# Patient Record
Sex: MALE | Race: WHITE | NOT HISPANIC OR LATINO | Employment: FULL TIME | URBAN - METROPOLITAN AREA
[De-identification: names, ages, dates, MRNs, and addresses within clinical notes are randomized per-mention and may not be internally consistent; named-entity substitution may affect disease eponyms.]

---

## 2020-06-09 ENCOUNTER — OFFICE VISIT (OUTPATIENT)
Dept: URGENT CARE | Facility: CLINIC | Age: 35
End: 2020-06-09
Payer: COMMERCIAL

## 2020-06-09 VITALS
OXYGEN SATURATION: 96 % | SYSTOLIC BLOOD PRESSURE: 133 MMHG | TEMPERATURE: 100 F | BODY MASS INDEX: 31.5 KG/M2 | RESPIRATION RATE: 18 BRPM | DIASTOLIC BLOOD PRESSURE: 75 MMHG | WEIGHT: 220 LBS | HEIGHT: 70 IN | HEART RATE: 107 BPM

## 2020-06-09 DIAGNOSIS — S83.402A SPRAIN OF COLLATERAL LIGAMENT OF LEFT KNEE, INITIAL ENCOUNTER: Primary | ICD-10-CM

## 2020-06-09 DIAGNOSIS — Z11.59 SCREENING FOR VIRAL DISEASE: ICD-10-CM

## 2020-06-09 PROCEDURE — 99214 PR OFFICE/OUTPT VISIT, EST, LEVL IV, 30-39 MIN: ICD-10-PCS | Mod: S$GLB,,, | Performed by: FAMILY MEDICINE

## 2020-06-09 PROCEDURE — 99214 OFFICE O/P EST MOD 30 MIN: CPT | Mod: S$GLB,,, | Performed by: FAMILY MEDICINE

## 2020-06-09 PROCEDURE — U0003 INFECTIOUS AGENT DETECTION BY NUCLEIC ACID (DNA OR RNA); SEVERE ACUTE RESPIRATORY SYNDROME CORONAVIRUS 2 (SARS-COV-2) (CORONAVIRUS DISEASE [COVID-19]), AMPLIFIED PROBE TECHNIQUE, MAKING USE OF HIGH THROUGHPUT TECHNOLOGIES AS DESCRIBED BY CMS-2020-01-R: HCPCS

## 2020-06-09 PROCEDURE — 73562 XR KNEE 3 VIEW LEFT: ICD-10-PCS | Mod: LT,S$GLB,, | Performed by: RADIOLOGY

## 2020-06-09 PROCEDURE — 73562 X-RAY EXAM OF KNEE 3: CPT | Mod: LT,S$GLB,, | Performed by: RADIOLOGY

## 2020-06-09 RX ORDER — IBUPROFEN 800 MG/1
800 TABLET ORAL EVERY 8 HOURS PRN
Qty: 30 TABLET | Refills: 1 | Status: SHIPPED | OUTPATIENT
Start: 2020-06-09 | End: 2021-06-09

## 2020-06-09 NOTE — PROGRESS NOTES
"Subjective:       Patient ID: Cristi Whitney is a 34 y.o. male.    Vitals:  height is 5' 10" (1.778 m) and weight is 99.8 kg (220 lb). His temperature is 99.9 °F (37.7 °C). His blood pressure is 133/75 and his pulse is 107. His respiration is 18 and oxygen saturation is 96%.     Chief Complaint: Knee Pain (left)    Patient presents with left knee pain since yesterday. Patient also complains of diarrhea since yesterday. Denies knee trauma but recollects possible twisting injury after dismounting from his truck. Progressively worse over the past 4 days. +/- swelling. Denies fever or other concerning symptoms    Knee Pain    The incident occurred 12 to 24 hours ago. The incident occurred at home. There was no injury mechanism. The pain is present in the left knee. The pain is at a severity of 5/10. The pain is mild. The pain has been worsening since onset. Associated symptoms include an inability to bear weight. He reports no foreign bodies present. The symptoms are aggravated by movement. He has tried nothing for the symptoms. The treatment provided no relief.       Constitution: Negative for appetite change, chills, sweating, fatigue and fever.   HENT: Negative for congestion and sore throat.    Neck: Negative for painful lymph nodes.   Cardiovascular: Negative for chest pain and leg swelling.   Eyes: Negative for double vision and blurred vision.   Respiratory: Negative for cough and shortness of breath.    Gastrointestinal: Positive for abdominal pain and diarrhea. Negative for abdominal trauma, abdominal bloating, history of abdominal surgery, nausea, vomiting, constipation, dark colored stools and heartburn.   Genitourinary: Negative for dysuria, frequency, urgency and pelvic pain.   Musculoskeletal: Positive for pain, joint pain and abnormal ROM of joint. Negative for joint swelling, back pain, muscle cramps and muscle ache.   Skin: Negative for color change, pale and rash.   Allergic/Immunologic: Negative for " seasonal allergies.   Neurological: Negative for dizziness, history of vertigo, light-headedness, passing out and headaches.   Hematologic/Lymphatic: Negative for swollen lymph nodes, easy bruising/bleeding and history of blood clots. Does not bruise/bleed easily.   Psychiatric/Behavioral: Negative for nervous/anxious, sleep disturbance and depression. The patient is not nervous/anxious.        Objective:      Physical Exam   Constitutional: He appears well-developed and well-nourished.   HENT:   Head: Normocephalic and atraumatic.   Eyes: EOM are normal.   Cardiovascular: Normal rate, regular rhythm and normal heart sounds.   Pulmonary/Chest: Effort normal and breath sounds normal.   Musculoskeletal: He exhibits edema and tenderness (lateral aspect of left knee. negative drawer signs. mild tenderness valgus maneuver). He exhibits no deformity.   Nursing note and vitals reviewed.        Assessment:       1. Sprain of collateral ligament of left knee, initial encounter    2. Screening for viral disease        Plan:         Sprain of collateral ligament of left knee, initial encounter  -     XR KNEE 3 VIEW LEFT; Future; Expected date: 06/09/2020  -     SPLINT FOR HOME USE  -     ibuprofen (ADVIL,MOTRIN) 800 MG tablet; Take 1 tablet (800 mg total) by mouth every 8 (eight) hours as needed.  Dispense: 30 tablet; Refill: 1    Screening for viral disease  -     COVID-19 Routine Screening

## 2020-06-09 NOTE — LETTER
June 9, 2020      [facility]  [address]  [facility] [address]  Phone: [phone]  Fax: [phone]       Patient: Cristi Whitney   YOB: 1985  Date of Visit: 06/09/2020    To Whom It May Concern:    Som Whitney  was at [facility] on 06/09/2020. He may return to work/school on 06/12/2020 with no restrictions. If you have any questions or concerns, or if I can be of further assistance, please do not hesitate to contact me.    Sincerely,            Ronni Coughlin MD

## 2020-06-09 NOTE — PATIENT INSTRUCTIONS
Knee Sprain of the Collateral Ligaments  The knee is a hinge joint supported by four strong ligaments. The two ligaments inside the knee protect this joint from excess forward and backward movement. The ligaments on the outside of the joint prevent side-to-side motion. These are called the collateral ligaments.  The medial collateral ligament is located on the inner side of the joint; and the lateral collateral ligament is on the outer side of the joint.  You have sprained one or both collateral ligaments. A sprain is a tearing of a ligament. The tear may be partial or complete. Diagnosis is made by physical exam. In the case of an acute injury, the knee may be too swollen or painful to examine fully. A more accurate exam can be done after the initial swelling goes down.  Symptoms of a knee sprain include immediate knee swelling, pain, and difficulty walking. Initial treatment includes rest, splinting the knee to reduce movement of the joint, and icing the knee to reduce swelling and pain. You may use over-the-counter pain medicine to control pain, unless another medicine was prescribed. Most sprains will heal in 3 to 6 weeks. A severe injury can take 3 to 4 months to heal. You will also need rehab exercises. Surgery is usually not required for sprains involving only the collateral ligaments.  Home care  · Stay off the injured leg as much as possible until you can walk on it without pain. If you have a lot of pain while walking, crutches, or a walker may be prescribed. These can be rented or purchased at many pharmacies and surgical or orthopedic supply stores. Follow your healthcare providers advice about when to begin bearing weight on that leg.   · If you were given a hook-and-loop closure knee brace, you can remove it to bathe. But leave it in place when walking, sitting, or lying down unless told otherwise.  · Apply an ice pack over the injured area for 15 to 20 minutes every 3 to 6 hours. You should do this  for the first 24 to 48 hours. You can make an ice pack by filling a plastic bag that seals at the top with ice cubes and then wrapping it with a thin towel. Continue to use ice packs for relief of pain and swelling as needed. As the ice melts, be careful to avoid getting your wrap, splint, or cast wet. After 48 hours, apply heat (warm shower or warm bath) for 15 to 20 minutes several times a day, or alternate ice and heat. You can place the ice pack directly over the splint. If you have to wear a hook-and-loop knee brace, you can open it to apply the ice pack, or heat, directly to the knee. Never put ice directly on the skin. Always wrap the ice in a towel or other type of cloth.  · You may use over-the-counter pain medicine to control pain, unless another pain medicine was prescribed. Anti-inflammatory pain medicines, such as ibuprofen or naproxen may be more effective than acetaminophen. If you have chronic liver or kidney disease or ever had a stomach ulcer or GI bleeding, talk with your healthcare provider before using these medicines.  Follow-up care  Follow up with your healthcare provider as advised. Any X-rays you had today dont show any broken bones, breaks, or fractures. Sometimes fractures dont show up on the first X-ray. Bruises and sprains can sometimes hurt as much as a fracture. These injuries can take time to heal completely. If your symptoms dont improve or they get worse, talk with your healthcare provider. You may need a repeat X-ray. If X-rays were taken, you will be told of any new findings that may affect your care.  Call 911  Call 911 if you have:  ·  Shortness of breath  ·  Chest pain  When to seek medical advice  Call your healthcare provider right away if any of these occur:  · Pain or swelling increases  · Swelling, redness, or pain in the calf or thigh  Date Last Reviewed: 11/20/2015  © 8349-8791 [employer]. [address] [place] [address], [place] [address]. All rights reserved.  This information is not intended as a substitute for professional medical care. Always follow your healthcare professional's instructions.        Knee Sprain    A sprain is an injury to the ligaments or capsule that holds a joint together. There are no broken bones. Most sprains take 3 to 6 weeks to heal. If it a severe sprain where the ligament is completely torn, it can take months to recover.  Most knee sprains are treated with a splint, knee immobilizer brace, or elastic wrap for support. Severe sprains may require surgery.  Home care  · Stay off the injured leg as much as possible until you can walk on it without pain. If you have a lot of pain with walking, crutches or a walker may be prescribed. (These can be rented or purchased at many pharmacies and surgical or orthopedic supply stores). Follow your healthcare provider's advice about when to begin putting weight on that leg.  · Keep your leg elevated to reduce pain and swelling. When sleeping, place a pillow under the injured leg. When sitting, support the injured leg so it is level with your waist. This is very important during the first 48 hours.  · Apply an ice pack over the injured area for 15 to 20 minutes every 3 to 6 hours. You should do this for the first 24 to 48 hours. You can make an ice pack by filling a plastic bag that seals at the top with ice cubes and then wrapping it with a thin towel. Continue to use ice packs for relief of pain and swelling as needed. As the ice melts, be careful to avoid getting your wrap, splint, or cast wet. After 48 hours, apply heat (warm shower or warm bath) for 15 to 20 minutes several times a day, or alternate ice and heat. You can place the ice pack directly over the splint. If you have to wear a hook-and-loop knee brace, you can open it to apply the ice pack, or heat, directly to the knee. Never put ice directly on the skin. Always wrap the ice in a towel or other type of cloth.  · You may use over-the-counter  pain medicine to control pain, unless another pain medicine was prescribed.If you have chronic liver or kidney disease or ever had a stomach ulcer or GI bleeding, talk with your healthcare provider before using these medicines.  · If you were given a splint, keep it completely dry at all times. Bathe with your splint out of the water, protected with 2 large plastic bags, rubber-banded at the top end. If a fiberglass splint gets wet, you can dry it with a hair dryer. If you have a hook-and-loop knee brace, you can remove this to bathe, unless told otherwise.  Follow-up care  Follow up with your doctor as advised. Any X-rays you had today dont show any broken bones, breaks, or fractures. Sometimes fractures dont show up on the first X-ray. Bruises and sprains can sometimes hurt as much as a fracture. These injuries can take time to heal completely. If your symptoms dont improve or they get worse, talk with your doctor. You may need a repeat X-ray. If X-rays were taken, you will be told of any new findings that may affect your care.  Call 911  Call 911 if you have:  ·  Shortness of breath  ·  Chest pain  When to seek medical advice  Call your healthcare provider right away if any of these occur:  · The splint or knee immobilizer brace becomes wet or soft  · The fiberglass cast or splint remains wet for more than 24 hours  · Pain or swelling increases  · The injured leg or toes become cold, blue, numb, or tingly  Date Last Reviewed: 11/20/2015  © 8962-0737 [employer] [address] [place] [address], [place] [address]. All rights reserved. This information is not intended as a substitute for professional medical care. Always follow your healthcare professional's instructions.

## 2020-06-11 ENCOUNTER — TELEPHONE (OUTPATIENT)
Dept: URGENT CARE | Facility: CLINIC | Age: 35
End: 2020-06-11

## 2020-06-11 LAB — SARS-COV-2 RNA RESP QL NAA+PROBE: NOT DETECTED

## 2024-12-13 ENCOUNTER — OFFICE VISIT (OUTPATIENT)
Dept: URGENT CARE | Facility: CLINIC | Age: 39
End: 2024-12-13
Payer: COMMERCIAL

## 2024-12-13 VITALS
DIASTOLIC BLOOD PRESSURE: 81 MMHG | HEART RATE: 105 BPM | BODY MASS INDEX: 31.5 KG/M2 | TEMPERATURE: 99 F | HEIGHT: 70 IN | WEIGHT: 220 LBS | RESPIRATION RATE: 18 BRPM | SYSTOLIC BLOOD PRESSURE: 137 MMHG | OXYGEN SATURATION: 100 %

## 2024-12-13 DIAGNOSIS — R05.1 ACUTE COUGH: ICD-10-CM

## 2024-12-13 DIAGNOSIS — J06.9 VIRAL URI WITH COUGH: Primary | ICD-10-CM

## 2024-12-13 DIAGNOSIS — R09.81 SINUS CONGESTION: ICD-10-CM

## 2024-12-13 RX ORDER — DEXTROAMPHETAMINE SACCHARATE, AMPHETAMINE ASPARTATE MONOHYDRATE, DEXTROAMPHETAMINE SULFATE AND AMPHETAMINE SULFATE 7.5; 7.5; 7.5; 7.5 MG/1; MG/1; MG/1; MG/1
30 CAPSULE, EXTENDED RELEASE ORAL EVERY MORNING
COMMUNITY

## 2024-12-13 RX ORDER — CHLOPHEDIANOL HCL AND PYRILAMINE MALEATE 12.5; 12.5 MG/5ML; MG/5ML
10 SOLUTION ORAL EVERY 8 HOURS PRN
Qty: 300 ML | Refills: 0 | Status: SHIPPED | OUTPATIENT
Start: 2024-12-13 | End: 2024-12-23

## 2024-12-13 NOTE — PATIENT INSTRUCTIONS
Symptomatic treatment to include:     Rest, increase fluid intake to include electrolyte replacement  Ibuprofen/Tylenol as directed for fever, sore throat, body aches  Ninjacof for relief of cough and congestion  Warm, salt water gargles, over the counter throat lozenges or sprays as desires.   ER for difficulty breathing not relieved by rest, excessive lethargy and/or change in mental status

## 2024-12-13 NOTE — PROGRESS NOTES
"Subjective:      Patient ID: Cristi Whitney is a 39 y.o. male.    Vitals:  height is 5' 10" (1.778 m) and weight is 99.8 kg (220 lb). His oral temperature is 98.6 °F (37 °C). His blood pressure is 137/81 and his pulse is 115 (abnormal). His respiration is 18 and oxygen saturation is 100%.     Chief Complaint: note for work    Pt states "he needs a work note to return to work."His symptoms started on Wednesday and include sinus congestion and cough.  Patient denies fever.  Patient denies shortness of breath or difficulty breathing.  Patient declined point of care testing at time of exam.  Patient would just like a note to return back to work on Monday      Constitution: Positive for chills and fatigue. Negative for fever.   HENT:  Positive for congestion, postnasal drip and sinus pressure.    Respiratory:  Positive for cough and sputum production. Negative for shortness of breath and wheezing.    Musculoskeletal: Negative.    Neurological:  Positive for headaches.      Objective:     Physical Exam   Constitutional: He is oriented to person, place, and time. He appears well-developed. He is cooperative.  Non-toxic appearance. He does not appear ill. No distress.   HENT:   Head: Normocephalic and atraumatic.   Ears:   Right Ear: Hearing, external ear and ear canal normal. A middle ear effusion is present.   Left Ear: Hearing, external ear and ear canal normal. A middle ear effusion is present.   Nose: Rhinorrhea and congestion present. No mucosal edema or nasal deformity. No epistaxis. Right sinus exhibits no maxillary sinus tenderness and no frontal sinus tenderness. Left sinus exhibits no maxillary sinus tenderness and no frontal sinus tenderness.   Mouth/Throat: Uvula is midline and mucous membranes are normal. Mucous membranes are moist. No trismus in the jaw. Normal dentition. No uvula swelling. Posterior oropharyngeal erythema present. No oropharyngeal exudate or posterior oropharyngeal edema.   Eyes: " Conjunctivae and lids are normal. No scleral icterus.   Neck: Trachea normal and phonation normal. Neck supple. No edema present. No erythema present. No neck rigidity present.   Cardiovascular: Normal rate, regular rhythm and normal heart sounds.   Pulmonary/Chest: Effort normal and breath sounds normal. No respiratory distress. He has no decreased breath sounds. He has no wheezes. He has no rhonchi. He has no rales.   Abdominal: Normal appearance.   Musculoskeletal: Normal range of motion.         General: No deformity. Normal range of motion.   Neurological: He is alert and oriented to person, place, and time. He exhibits normal muscle tone. Coordination normal.   Skin: Skin is warm, dry, intact, not diaphoretic and not pale.   Psychiatric: His speech is normal and behavior is normal. Judgment and thought content normal.   Nursing note and vitals reviewed.      Assessment:     1. Viral URI with cough    2. Acute cough    3. Sinus congestion        Plan:       Viral URI with cough    Acute cough  -     pyrilamine-chlophedianoL (NINJACOF) 12.5-12.5 mg/5 mL Liqd; Take 10 mLs by mouth every 8 (eight) hours as needed.  Dispense: 300 mL; Refill: 0    Sinus congestion  -     pyrilamine-chlophedianoL (NINJACOF) 12.5-12.5 mg/5 mL Liqd; Take 10 mLs by mouth every 8 (eight) hours as needed.  Dispense: 300 mL; Refill: 0      Declined testing  Discussed symptoms management.   Work note provided  Discussed medication with patient who acknowledges understanding and is agreeable to POC. Follow up with primary care. Increase fluid intake. Red flags for ER discussed.

## 2024-12-13 NOTE — LETTER
December 13, 2024      [facility]  [address]  [facility] [address]  Phone: [phone]       Patient: Cristi Whitney   YOB: 1985  Date of Visit: 12/13/2024    To Whom It May Concern:    Som Whitney  was at [facility] on 12/13/2024. The patient may return to work on 12/16/2024 with no restrictions. If you have any questions or concerns, or if I can be of further assistance, please do not hesitate to contact me.    Sincerely,    Sabine Alonso NP